# Patient Record
Sex: FEMALE | Race: WHITE | Employment: UNEMPLOYED | URBAN - METROPOLITAN AREA
[De-identification: names, ages, dates, MRNs, and addresses within clinical notes are randomized per-mention and may not be internally consistent; named-entity substitution may affect disease eponyms.]

---

## 2019-12-03 LAB
ABO, EXTERNAL RESULT: NORMAL
HEP B, EXTERNAL RESULT: NEGATIVE
RH FACTOR, EXTERNAL RESULT: POSITIVE
RPR, EXTERNAL RESULT: NON REACTIVE
RUBELLA TITER, EXTERNAL RESULT: NORMAL

## 2020-04-20 LAB
GBS, EXTERNAL RESULT: NEGATIVE
HEPATITIS C ANTIBODY, EXTERNAL RESULT: NEGATIVE

## 2020-05-12 LAB
C. TRACHOMATIS, EXTERNAL RESULT: NEGATIVE
N. GONORRHOEAE, EXTERNAL RESULT: NEGATIVE

## 2020-06-26 ENCOUNTER — OFFICE VISIT (OUTPATIENT)
Dept: PRIMARY CARE CLINIC | Age: 30
End: 2020-06-26

## 2020-06-26 PROCEDURE — 99211 OFF/OP EST MAY X REQ PHY/QHP: CPT | Performed by: FAMILY MEDICINE

## 2020-06-26 NOTE — PROGRESS NOTES
Patient is having a delivery with Alessandra Elena on 7/10/20 and was seen at clinic for prenatal covid test. . Patient instructed to self quarantine until she goes into labor or is instructed to go to hospital.    Napoleon Mason MD, personally performed the services described in the documentation as scribed by the CMA/LPN/RN listed in the chart, in my presence and it is both accurate and complete.   Electronically signed by Latasha Cleaning MD on 6/29/2020 at 8:26 AM.

## 2020-06-29 LAB
SARS-COV-2: NOT DETECTED
SOURCE: NORMAL

## 2020-07-07 ENCOUNTER — OFFICE VISIT (OUTPATIENT)
Dept: PRIMARY CARE CLINIC | Age: 30
End: 2020-07-07

## 2020-07-07 LAB — SARS-COV-2, PCR: NOT DETECTED

## 2020-07-07 PROCEDURE — 99211 OFF/OP EST MAY X REQ PHY/QHP: CPT | Performed by: NURSE PRACTITIONER

## 2020-07-10 ENCOUNTER — HOSPITAL ENCOUNTER (INPATIENT)
Age: 30
LOS: 1 days | Discharge: HOME | End: 2020-07-11
Attending: OBSTETRICS & GYNECOLOGY | Admitting: OBSTETRICS & GYNECOLOGY
Payer: COMMERCIAL

## 2020-07-10 ENCOUNTER — ANESTHESIA (OUTPATIENT)
Dept: LABOR AND DELIVERY | Age: 30
End: 2020-07-10
Payer: COMMERCIAL

## 2020-07-10 ENCOUNTER — ANESTHESIA EVENT (OUTPATIENT)
Dept: LABOR AND DELIVERY | Age: 30
End: 2020-07-10
Payer: COMMERCIAL

## 2020-07-10 ENCOUNTER — APPOINTMENT (OUTPATIENT)
Dept: LABOR AND DELIVERY | Age: 30
End: 2020-07-10
Payer: COMMERCIAL

## 2020-07-10 PROBLEM — Z34.90 TERM PREGNANCY: Status: ACTIVE | Noted: 2020-07-10

## 2020-07-10 LAB
AMPHETAMINE SCREEN, URINE: NORMAL
BARBITURATE SCREEN URINE: NORMAL
BASOPHILS ABSOLUTE: 0 K/UL (ref 0–0.2)
BASOPHILS RELATIVE PERCENT: 0.6 %
BENZODIAZEPINE SCREEN, URINE: NORMAL
BUPRENORPHINE URINE: NORMAL
CANNABINOID SCREEN URINE: NORMAL
COCAINE METABOLITE SCREEN URINE: NORMAL
EOSINOPHILS ABSOLUTE: 0 K/UL (ref 0–0.6)
EOSINOPHILS RELATIVE PERCENT: 0.7 %
HCT VFR BLD CALC: 34.7 % (ref 36–48)
HEMOGLOBIN: 11.9 G/DL (ref 12–16)
LYMPHOCYTES ABSOLUTE: 1.5 K/UL (ref 1–5.1)
LYMPHOCYTES RELATIVE PERCENT: 21.8 %
Lab: NORMAL
MCH RBC QN AUTO: 30 PG (ref 26–34)
MCHC RBC AUTO-ENTMCNC: 34.2 G/DL (ref 31–36)
MCV RBC AUTO: 87.8 FL (ref 80–100)
METHADONE SCREEN, URINE: NORMAL
MONOCYTES ABSOLUTE: 0.5 K/UL (ref 0–1.3)
MONOCYTES RELATIVE PERCENT: 8.2 %
NEUTROPHILS ABSOLUTE: 4.6 K/UL (ref 1.7–7.7)
NEUTROPHILS RELATIVE PERCENT: 68.7 %
OPIATE SCREEN URINE: NORMAL
OXYCODONE URINE: NORMAL
PDW BLD-RTO: 12.7 % (ref 12.4–15.4)
PH UA: 7
PHENCYCLIDINE SCREEN URINE: NORMAL
PLATELET # BLD: 186 K/UL (ref 135–450)
PMV BLD AUTO: 7.9 FL (ref 5–10.5)
PROPOXYPHENE SCREEN: NORMAL
RBC # BLD: 3.95 M/UL (ref 4–5.2)
SPECIMEN STATUS: NORMAL
TOTAL SYPHILLIS IGG/IGM: NORMAL
WBC # BLD: 6.7 K/UL (ref 4–11)

## 2020-07-10 PROCEDURE — 2580000003 HC RX 258

## 2020-07-10 PROCEDURE — 6370000000 HC RX 637 (ALT 250 FOR IP): Performed by: OBSTETRICS & GYNECOLOGY

## 2020-07-10 PROCEDURE — 10907ZC DRAINAGE OF AMNIOTIC FLUID, THERAPEUTIC FROM PRODUCTS OF CONCEPTION, VIA NATURAL OR ARTIFICIAL OPENING: ICD-10-PCS | Performed by: OBSTETRICS & GYNECOLOGY

## 2020-07-10 PROCEDURE — 51701 INSERT BLADDER CATHETER: CPT

## 2020-07-10 PROCEDURE — 80307 DRUG TEST PRSMV CHEM ANLYZR: CPT

## 2020-07-10 PROCEDURE — 0KQM0ZZ REPAIR PERINEUM MUSCLE, OPEN APPROACH: ICD-10-PCS | Performed by: OBSTETRICS & GYNECOLOGY

## 2020-07-10 PROCEDURE — 1220000000 HC SEMI PRIVATE OB R&B

## 2020-07-10 PROCEDURE — 3700000025 EPIDURAL BLOCK: Performed by: ANESTHESIOLOGY

## 2020-07-10 PROCEDURE — 85025 COMPLETE CBC W/AUTO DIFF WBC: CPT

## 2020-07-10 PROCEDURE — 2580000003 HC RX 258: Performed by: OBSTETRICS & GYNECOLOGY

## 2020-07-10 PROCEDURE — 7200000001 HC VAGINAL DELIVERY

## 2020-07-10 PROCEDURE — 2500000003 HC RX 250 WO HCPCS: Performed by: NURSE ANESTHETIST, CERTIFIED REGISTERED

## 2020-07-10 PROCEDURE — 86780 TREPONEMA PALLIDUM: CPT

## 2020-07-10 RX ORDER — FERROUS SULFATE 325(65) MG
325 TABLET ORAL 2 TIMES DAILY WITH MEALS
Status: DISCONTINUED | OUTPATIENT
Start: 2020-07-10 | End: 2020-07-11 | Stop reason: HOSPADM

## 2020-07-10 RX ORDER — ONDANSETRON 2 MG/ML
4 INJECTION INTRAMUSCULAR; INTRAVENOUS EVERY 6 HOURS PRN
Status: DISCONTINUED | OUTPATIENT
Start: 2020-07-10 | End: 2020-07-10

## 2020-07-10 RX ORDER — ACETAMINOPHEN 325 MG/1
650 TABLET ORAL EVERY 4 HOURS PRN
Status: DISCONTINUED | OUTPATIENT
Start: 2020-07-10 | End: 2020-07-11 | Stop reason: HOSPADM

## 2020-07-10 RX ORDER — DOCUSATE SODIUM 100 MG/1
100 CAPSULE, LIQUID FILLED ORAL 2 TIMES DAILY PRN
Status: DISCONTINUED | OUTPATIENT
Start: 2020-07-10 | End: 2020-07-11 | Stop reason: HOSPADM

## 2020-07-10 RX ORDER — ACETAMINOPHEN 325 MG/1
650 TABLET ORAL EVERY 4 HOURS PRN
Status: DISCONTINUED | OUTPATIENT
Start: 2020-07-10 | End: 2020-07-10

## 2020-07-10 RX ORDER — SODIUM CHLORIDE 0.9 % (FLUSH) 0.9 %
10 SYRINGE (ML) INJECTION EVERY 12 HOURS SCHEDULED
Status: DISCONTINUED | OUTPATIENT
Start: 2020-07-10 | End: 2020-07-10

## 2020-07-10 RX ORDER — SODIUM CHLORIDE, SODIUM LACTATE, POTASSIUM CHLORIDE, CALCIUM CHLORIDE 600; 310; 30; 20 MG/100ML; MG/100ML; MG/100ML; MG/100ML
INJECTION, SOLUTION INTRAVENOUS CONTINUOUS
Status: DISCONTINUED | OUTPATIENT
Start: 2020-07-10 | End: 2020-07-10

## 2020-07-10 RX ORDER — DIPHENHYDRAMINE HCL 25 MG
25 TABLET ORAL EVERY 4 HOURS PRN
Status: DISCONTINUED | OUTPATIENT
Start: 2020-07-10 | End: 2020-07-10

## 2020-07-10 RX ORDER — LANOLIN 100 %
OINTMENT (GRAM) TOPICAL PRN
Status: DISCONTINUED | OUTPATIENT
Start: 2020-07-10 | End: 2020-07-11 | Stop reason: HOSPADM

## 2020-07-10 RX ORDER — BUPIVACAINE HYDROCHLORIDE 2.5 MG/ML
INJECTION, SOLUTION EPIDURAL; INFILTRATION; INTRACAUDAL PRN
Status: DISCONTINUED | OUTPATIENT
Start: 2020-07-10 | End: 2020-07-10 | Stop reason: SDUPTHER

## 2020-07-10 RX ORDER — SIMETHICONE 80 MG
80 TABLET,CHEWABLE ORAL EVERY 6 HOURS PRN
Status: DISCONTINUED | OUTPATIENT
Start: 2020-07-10 | End: 2020-07-11 | Stop reason: HOSPADM

## 2020-07-10 RX ORDER — BUPIVACAINE HYDROCHLORIDE 5 MG/ML
INJECTION, SOLUTION EPIDURAL; INTRACAUDAL PRN
Status: DISCONTINUED | OUTPATIENT
Start: 2020-07-10 | End: 2020-07-10 | Stop reason: SDUPTHER

## 2020-07-10 RX ORDER — SODIUM CHLORIDE 0.9 % (FLUSH) 0.9 %
10 SYRINGE (ML) INJECTION EVERY 12 HOURS SCHEDULED
Status: DISCONTINUED | OUTPATIENT
Start: 2020-07-10 | End: 2020-07-11 | Stop reason: HOSPADM

## 2020-07-10 RX ORDER — SODIUM CHLORIDE, SODIUM LACTATE, POTASSIUM CHLORIDE, CALCIUM CHLORIDE 600; 310; 30; 20 MG/100ML; MG/100ML; MG/100ML; MG/100ML
INJECTION, SOLUTION INTRAVENOUS CONTINUOUS
Status: DISCONTINUED | OUTPATIENT
Start: 2020-07-10 | End: 2020-07-11 | Stop reason: HOSPADM

## 2020-07-10 RX ORDER — SODIUM CHLORIDE 0.9 % (FLUSH) 0.9 %
10 SYRINGE (ML) INJECTION PRN
Status: DISCONTINUED | OUTPATIENT
Start: 2020-07-10 | End: 2020-07-10

## 2020-07-10 RX ORDER — SODIUM CHLORIDE, SODIUM LACTATE, POTASSIUM CHLORIDE, CALCIUM CHLORIDE 600; 310; 30; 20 MG/100ML; MG/100ML; MG/100ML; MG/100ML
INJECTION, SOLUTION INTRAVENOUS
Status: COMPLETED
Start: 2020-07-10 | End: 2020-07-10

## 2020-07-10 RX ORDER — SODIUM CHLORIDE 0.9 % (FLUSH) 0.9 %
10 SYRINGE (ML) INJECTION PRN
Status: DISCONTINUED | OUTPATIENT
Start: 2020-07-10 | End: 2020-07-11 | Stop reason: HOSPADM

## 2020-07-10 RX ORDER — IBUPROFEN 800 MG/1
800 TABLET ORAL EVERY 6 HOURS PRN
Status: DISCONTINUED | OUTPATIENT
Start: 2020-07-10 | End: 2020-07-11 | Stop reason: HOSPADM

## 2020-07-10 RX ORDER — ONDANSETRON 2 MG/ML
4 INJECTION INTRAMUSCULAR; INTRAVENOUS EVERY 6 HOURS PRN
Status: DISCONTINUED | OUTPATIENT
Start: 2020-07-10 | End: 2020-07-11 | Stop reason: HOSPADM

## 2020-07-10 RX ADMIN — SODIUM CHLORIDE, POTASSIUM CHLORIDE, SODIUM LACTATE AND CALCIUM CHLORIDE 1000 ML: 600; 310; 30; 20 INJECTION, SOLUTION INTRAVENOUS at 02:30

## 2020-07-10 RX ADMIN — DOCUSATE SODIUM 100 MG: 100 CAPSULE, LIQUID FILLED ORAL at 18:55

## 2020-07-10 RX ADMIN — Medication 10 ML: at 22:26

## 2020-07-10 RX ADMIN — WITCH HAZEL 40 EACH: 500 SOLUTION RECTAL; TOPICAL at 10:33

## 2020-07-10 RX ADMIN — SODIUM CHLORIDE, POTASSIUM CHLORIDE, SODIUM LACTATE AND CALCIUM CHLORIDE: 600; 310; 30; 20 INJECTION, SOLUTION INTRAVENOUS at 04:28

## 2020-07-10 RX ADMIN — ACETAMINOPHEN 650 MG: 325 TABLET ORAL at 22:26

## 2020-07-10 RX ADMIN — BUPIVACAINE HYDROCHLORIDE 8 ML: 2.5 INJECTION, SOLUTION EPIDURAL; INFILTRATION; INTRACAUDAL; PERINEURAL at 03:11

## 2020-07-10 RX ADMIN — BUPIVACAINE HYDROCHLORIDE 4 ML: 5 INJECTION, SOLUTION EPIDURAL; INTRACAUDAL; PERINEURAL at 04:01

## 2020-07-10 RX ADMIN — SODIUM CHLORIDE, POTASSIUM CHLORIDE, SODIUM LACTATE AND CALCIUM CHLORIDE: 600; 310; 30; 20 INJECTION, SOLUTION INTRAVENOUS at 02:44

## 2020-07-10 RX ADMIN — BENZOCAINE AND LEVOMENTHOL: 200; 5 SPRAY TOPICAL at 10:33

## 2020-07-10 RX ADMIN — IBUPROFEN 800 MG: 800 TABLET, FILM COATED ORAL at 18:55

## 2020-07-10 RX ADMIN — Medication 15 ML/HR: at 03:18

## 2020-07-10 SDOH — HEALTH STABILITY: MENTAL HEALTH: HOW OFTEN DO YOU HAVE A DRINK CONTAINING ALCOHOL?: NEVER

## 2020-07-10 ASSESSMENT — PAIN SCALES - GENERAL
PAINLEVEL_OUTOF10: 3
PAINLEVEL_OUTOF10: 3

## 2020-07-10 NOTE — PROGRESS NOTES
First time get up to BR w/ assist x 2 RN's. Pt voided 500 cc urine without difficulty. Pt performed kelli care per self after instruction. New ice pack, peripad, and underwear provided. Pt back to bed w/ out incident, gait steady. Pt tolerated well.

## 2020-07-10 NOTE — PLAN OF CARE
Problem: Anxiety:  Goal: Level of anxiety will decrease  Description: Level of anxiety will decrease  Outcome: Ongoing     Problem: Breathing Pattern - Ineffective:  Goal: Able to breathe comfortably  Description: Able to breathe comfortably  Outcome: Ongoing     Problem: Fluid Volume - Imbalance:  Goal: Absence of imbalanced fluid volume signs and symptoms  Description: Absence of imbalanced fluid volume signs and symptoms  Outcome: Ongoing  Goal: Absence of intrapartum hemorrhage signs and symptoms  Description: Absence of intrapartum hemorrhage signs and symptoms  Outcome: Ongoing     Problem: Infection - Intrapartum Infection:  Goal: Will show no infection signs and symptoms  Description: Will show no infection signs and symptoms  Outcome: Ongoing     Problem: Labor Process - Prolonged:  Goal: Labor progression, first stage, within specified pattern  Description: Labor progression, first stage, within specified pattern  Outcome: Ongoing  Goal: Labor progession, second stage, within specified pattern  Description: Labor progession, second stage, within specified pattern  Outcome: Ongoing  Goal: Uterine contractions within specified parameters  Description: Uterine contractions within specified parameters  Outcome: Ongoing     Problem:  Screening:  Goal: Ability to make informed decisions regarding treatment has improved  Description: Ability to make informed decisions regarding treatment has improved  Outcome: Ongoing     Problem: Pain - Acute:  Goal: Pain level will decrease  Description: Pain level will decrease  Outcome: Ongoing  Goal: Able to cope with pain  Description: Able to cope with pain  Outcome: Ongoing     Problem: Tissue Perfusion - Uteroplacental, Altered:  Goal: Absence of abnormal fetal heart rate pattern  Description: Absence of abnormal fetal heart rate pattern  Outcome: Ongoing     Problem: Urinary Retention:  Goal: Experiences of bladder distention will decrease  Description: Experiences of bladder distention will decrease  Outcome: Ongoing  Goal: Urinary elimination within specified parameters  Description: Urinary elimination within specified parameters  Outcome: Ongoing     Problem: Pain:  Goal: Pain level will decrease  Description: Pain level will decrease  Outcome: Ongoing  Goal: Control of acute pain  Description: Control of acute pain  Outcome: Ongoing  Goal: Control of chronic pain  Description: Control of chronic pain  Outcome: Ongoing

## 2020-07-10 NOTE — ANESTHESIA PRE PROCEDURE
Department of Anesthesiology  Preprocedure Note       Name:  Joel Batista   Age:  26 y.o.  :  1990                                          MRN:  [id number]         Date:  7/10/2020      Surgeon: * No surgeons listed *    Procedure: * No procedures listed *    Medications prior to admission:   Prior to Admission medications    Medication Sig Start Date End Date Taking? Authorizing Provider   Prenatal Multivit-Min-Fe-FA (PRE- FORMULA) TABS Take by mouth   Yes Historical Provider, MD       Current medications:    Current Facility-Administered Medications   Medication Dose Route Frequency Provider Last Rate Last Dose    lactated ringers infusion   Intravenous Continuous Yordy Solis  mL/hr at 07/10/20 0244      sodium chloride flush 0.9 % injection 10 mL  10 mL Intravenous 2 times per day Yordy Solis MD        sodium chloride flush 0.9 % injection 10 mL  10 mL Intravenous PRN Yordy Solis MD        acetaminophen (TYLENOL) tablet 650 mg  650 mg Oral Q4H PRN Yordy Solis MD        diphenhydrAMINE (BENADRYL) tablet 25 mg  25 mg Oral Q4H PRN Yordy Solis MD        ondansetron [facility]) injection 4 mg  4 mg Intravenous Q6H PRN Yordy Solis MD        oxytocin (PITOCIN) 30 units in 500 mL infusion  1 leonard-units/min Intravenous Continuous PRN Yordy Solis MD           Allergies:  No Known Allergies    Problem List:  There is no problem list on file for this patient. Past Medical History:        Diagnosis Date    Anemia     with previous pregnancy       Past Surgical History:  History reviewed. No pertinent surgical history.     Social History:    Social History     Tobacco Use    Smoking status: Never Smoker    Smokeless tobacco: Never Used   Substance Use Topics    Alcohol use: Never     Frequency: Never                                Counseling given: Not Answered      Vital Signs (Current):   Vitals: 07/10/20 0227 07/10/20 0236   BP:  131/78   Pulse:  76   Resp:  20   Temp:  36.5 °C (97.7 °F)   TempSrc:  Oral   Weight: 210 lb (95.3 kg)    Height: 5' 5\" (1.651 m)                                               BP Readings from Last 3 Encounters:   07/10/20 131/78       NPO Status: Time of last liquid consumption: 2300                        Time of last solid consumption: 2000                        Date of last liquid consumption: 07/09/20                        Date of last solid food consumption: 07/09/20    BMI:   Wt Readings from Last 3 Encounters:   07/10/20 210 lb (95.3 kg)     Body mass index is 34.95 kg/m². CBC:   Lab Results   Component Value Date    WBC 6.7 07/10/2020    RBC 3.95 07/10/2020    HGB 11.9 07/10/2020    HCT 34.7 07/10/2020    MCV 87.8 07/10/2020    RDW 12.7 07/10/2020     07/10/2020       CMP: No results found for: NA, K, CL, CO2, BUN, CREATININE, GFRAA, AGRATIO, LABGLOM, GLUCOSE, PROT, CALCIUM, BILITOT, ALKPHOS, AST, ALT    POC Tests: No results for input(s): POCGLU, POCNA, POCK, POCCL, POCBUN, POCHEMO, POCHCT in the last 72 hours.     Coags: No results found for: PROTIME, INR, APTT    HCG (If Applicable): No results found for: PREGTESTUR, PREGSERUM, HCG, HCGQUANT     ABGs: No results found for: PHART, PO2ART, AQL3WVY, EAL8IOT, BEART, W1SEVQPO     Type & Screen (If Applicable):  No results found for: LABABO, LABRH    Drug/Infectious Status (If Applicable):  No results found for: HIV, HEPCAB    COVID-19 Screening (If Applicable):   Lab Results   Component Value Date    COVID19 Not Detected 07/07/2020         Anesthesia Evaluation  Patient summary reviewed and Nursing notes reviewed no history of anesthetic complications:   Airway: Mallampati: II     Neck ROM: full   Dental: normal exam         Pulmonary:Negative Pulmonary ROS and normal exam                               Cardiovascular:Negative CV ROS                      Neuro/Psych:   Negative Neuro/Psych ROS GI/Hepatic/Renal: Neg GI/Hepatic/Renal ROS            Endo/Other: Negative Endo/Other ROS                    Abdominal:           Vascular:                                        Anesthesia Plan      epidural     ASA 2 - emergent     (I discussed with the patient the risks and benefits of labor epidural placement. All questions were answered the patient agrees with the plan. Recent Vitals:  ---------------------------               07/10/20                      0236         ---------------------------   BP:          131/78         Pulse:         76           Resp:          20           Temp:   36.5 °C (97.7 °F)  ---------------------------  Body mass index is 34.95 kg/m². Social History    Tobacco Use      Smoking status: Never Smoker      Smokeless tobacco: Never Used      LABS:    CBC  Lab Results       Component                Value               Date/Time                  WBC                      6.7                 07/10/2020 02:22 AM        HGB                      11.9 (L)            07/10/2020 02:22 AM        HCT                      34.7 (L)            07/10/2020 02:22 AM        PLT                      186                 07/10/2020 02:22 AM   RENAL  No results found for: NA, K, CL, CO2, BUN, CREATININE, GLUCOSE  COAGS  No results found for: PROTIME, INR, APTT)        Anesthetic plan and risks discussed with patient.                       Grabiel Cheek, APRN - CRNA   7/10/2020

## 2020-07-10 NOTE — FLOWSHEET NOTE
Pt presents to triage with complaints of contractions since 2330. Pt denies any leaking of fluid or vaginal bleeding. Pt to triage room #2.

## 2020-07-10 NOTE — H&P
Department of Obstetrics and Gynecology  Labor and Delivery   Attending Progress Note      SUBJECTIVE:  Patient presented in active labor    OBJECTIVE:      Fetal heart rate:       Baseline Heart Rate:  145        Accelerations:  present       Long Term Variability:  moderate       Decelerations:  absent         Contraction frequency: 3 minutes    Membranes:  Intact    Cervix:  6 cm at presentation                  ASSESSMENT & PLAN:    26 y.o.    OB History        2    Para   1    Term   1            AB        Living   1       SAB        TAB        Ectopic        Molar        Multiple        Live Births   1             40w0d  1. FWB: reassuring  2. ACOG reviewed. A+, GBS neg. ES wnl.   Need stick injury in April (Hiv,HepC: neg)

## 2020-07-10 NOTE — ANESTHESIA PROCEDURE NOTES
Epidural Block    Patient location during procedure: OB  Reason for block: labor epidural  Staffing  Resident/CRNA: MARILEE Cisse - CRNA  Preanesthetic Checklist  Completed: patient identified, pre-op evaluation, timeout performed, IV checked, risks and benefits discussed, monitors and equipment checked, anesthesia consent given, oxygen available and patient being monitored  Epidural  Patient position: sitting  Prep: ChloraPrep and site prepped and draped  Patient monitoring: continuous pulse ox and frequent blood pressure checks  Approach: midline  Location: lumbar (1-5)  Injection technique: HANNA saline  Provider prep: mask and sterile gloves  Needle  Needle type: Tuohy   Needle gauge: 17 G  Needle length: 3.5 in  Needle insertion depth: 7 cm  Catheter type: side hole  Catheter size: 19 G  Catheter at skin depth: 12 cm  Test dose: negative  Assessment  Hemodynamics: stable  Attempts: 1  Additional Notes  Pt prepped and draped in sterile fashion. Skin wheal with 1% lidocaine. HANNA with 3 cc NS, 25g spinal needle inserted per adán. Clear CSF visualized in hub. Needle withdrawn and catheter threaded. Negative test dose 3cc 1.5% Lidocaine with Epinephrine. Sterile dressing applied.

## 2020-07-10 NOTE — L&D DELIVERY SUMMARY NOTE
[address] [address]                            LABOR AND DELIVERY NOTE    PATIENT NAME: Kym Martinez                 :        1990  MED REC NO:   [id number]                          ROOM:       0387  ACCOUNT NO:   [de-identified]                           ADMIT DATE: 07/10/2020  PROVIDER:     Merly Dean MD    DATE OF PROCEDURE:  07/10/2020    DELIVERY SUMMARY    The patient is a 25-year-old G2, P1-0-0-1 at 40 weeks gestation who came  in active phase labor. Her pregnancy was uncomplicated. She requested  and received an epidural.  She made change to complete and +1 station. She is artificially ruptured for clear fluid. She is GBS negative. Did  not receive antibiotics. She began pushing. She pushed for  approximately 5 minutes. The head was at the perineum. She had a  spontaneous vaginal delivery of a baby boy in OA position and rest of  the infant delivered atraumatically. Placed on the mother's abdomen. Cord was cut and clamped after one minute of delayed cord clamping. Delivery of placenta was spontaneous. Perineum and vagina were  explored. A second-degree laceration was repaired in a standard fashion  using 3-0 Vicryl. . Mom and baby doing well at the end of this  dictation.         Robert Groves MD    D: 07/10/2020 9:28:47       T: 07/10/2020 13:21:56     [email or web address]  Job#: [id number]     Doc#: [id number]    CC:

## 2020-07-10 NOTE — L&D DELIVERY NOTE
Department of Obstetrics and Gynecology  Spontaneous Vaginal Delivery Note    Labor & Delivery Summary  Labor Onset Date: 07/10/20  Labor Onset Time: 0210  Dilation Complete Date: 07/10/20  Dilation Complete Time: 55    Pre-operative Diagnosis: 25 yo  at 40wk SOL   Post-operative Diagnosis:  Living  infant(s) and Male    Procedure:  Spontaneous vaginal delivery    Surgeon:   Dr. Rivera Grams for the patient's :  Izzy Boone [[id number]]   APGAR One: 8     Information for the patient's :  Luis Miguel Abrahama [[id number]]   APGAR Five: 9      Information for the patient's :  Jill Burton, Baby Boy Slivia Abrahama [[id number]]   Birth Weight: N/A      Anesthesia:  epidural anesthesia    Estimated blood loss:  200ml    Specimen:  Placenta not sent to pathology     Cord blood sent No    Complications:  none    Condition:  infant stable to general nursery and mother stable    Details of Procedure: The patient is a 26 y.o. female at 37w0d   OB History        2    Para   1    Term   1            AB        Living   1       SAB        TAB        Ectopic        Molar        Multiple        Live Births   1             who was admitted for active phase labor. She received the following interventions: Tila Alessandro She was known to be GBS negative and did not receive antibiotic prophylaxis. The patient progressed well,did  receive an epidural, became complete and started to push. After pushing for 5 min the fetal head was at the perineum and the rest of the infant delivered atraumatically, placed on mother abdomen. Cord was clamped and cut after 1 minute delayed cord clamping. The delivery of the placenta was spontaneous. The perineum and vagina were explored and a second degree laceration was repaired in standard fashion.     Dictation #: [id number]    Curtis Byers MD

## 2020-07-11 VITALS
TEMPERATURE: 98.1 F | RESPIRATION RATE: 18 BRPM | HEIGHT: 65 IN | HEART RATE: 83 BPM | WEIGHT: 210 LBS | DIASTOLIC BLOOD PRESSURE: 68 MMHG | SYSTOLIC BLOOD PRESSURE: 109 MMHG | BODY MASS INDEX: 34.99 KG/M2

## 2020-07-11 LAB
HCT VFR BLD CALC: 31.5 % (ref 36–48)
HEMOGLOBIN: 10.6 G/DL (ref 12–16)
MCH RBC QN AUTO: 30.5 PG (ref 26–34)
MCHC RBC AUTO-ENTMCNC: 33.6 G/DL (ref 31–36)
MCV RBC AUTO: 90.8 FL (ref 80–100)
PDW BLD-RTO: 12.5 % (ref 12.4–15.4)
PLATELET # BLD: 166 K/UL (ref 135–450)
PMV BLD AUTO: 8.1 FL (ref 5–10.5)
RBC # BLD: 3.47 M/UL (ref 4–5.2)
WBC # BLD: 7.7 K/UL (ref 4–11)

## 2020-07-11 PROCEDURE — 6370000000 HC RX 637 (ALT 250 FOR IP): Performed by: OBSTETRICS & GYNECOLOGY

## 2020-07-11 PROCEDURE — 36415 COLL VENOUS BLD VENIPUNCTURE: CPT

## 2020-07-11 PROCEDURE — 85027 COMPLETE CBC AUTOMATED: CPT

## 2020-07-11 RX ADMIN — DOCUSATE SODIUM 100 MG: 100 CAPSULE, LIQUID FILLED ORAL at 09:45

## 2020-07-11 RX ADMIN — WITCH HAZEL 40 EACH: 500 SOLUTION RECTAL; TOPICAL at 09:45

## 2020-07-11 RX ADMIN — IBUPROFEN 800 MG: 800 TABLET, FILM COATED ORAL at 09:45

## 2020-07-11 RX ADMIN — BENZOCAINE AND LEVOMENTHOL: 200; 5 SPRAY TOPICAL at 09:45

## 2020-07-11 ASSESSMENT — PAIN SCALES - GENERAL: PAINLEVEL_OUTOF10: 0

## 2020-07-11 NOTE — DISCHARGE SUMMARY
Department of Obstetrics and Gynecology  Postpartum Discharge Summary      Admit Date: 7/10/2020    Admit Diagnosis: Term pregnancy [Z34.90]    Discharge Date: 20    Condition at Discharge: good    Discharge Diagnoses: spontaneous vaginal delivery    Discharge Disposition:  Home    Service: Obstetrics    Postpartum complications: none     Hospital Course: uncomplicated    [place] Data:  Information for the patient's :  Ginna Urbina [[id number]]        Weight   Information for the patient's :  Ginna Urbina [[id number]]        Apgars   Information for the patient's :  Ginna Urbina [[id number]]         Disposition of Baby:  Home with mother      Current Discharge Medication List      CONTINUE these medications which have NOT CHANGED    Details   Prenatal Multivit-Min-Fe-FA (PRE-ANABELA FORMULA) TABS Take by mouth               Follow-up 6 weeks in office. Call for appointment.         Electronically signed by Flavio Lockett MD on 2020 at 9:46 AM

## 2020-07-11 NOTE — PROGRESS NOTES
Department of Obstetrics and Gynecology  Labor and Delivery  Attending Post Partum Progress Note      SUBJECTIVE:  No probs. Lochia normal. Infant is well. Desire circ for infant. Pt wants to go home. OBJECTIVE:      Vitals:  /61   Pulse 76   Temp 98 °F (36.7 °C) (Axillary)   Resp 16   Ht 5' 5\" (1.651 m)   Wt 210 lb (95.3 kg)   LMP 10/04/2019   Breastfeeding Unknown   BMI 34.95 kg/m²     ABDOMEN:  FFNT  GENITAL/URINARY:  Deferred  EXT:  NT    DATA:    CBC:    Lab Results   Component Value Date    WBC 7.7 2020    RBC 3.47 2020    HGB 10.6 2020    HCT 31.5 2020    MCV 90.8 2020    RDW 12.5 2020     2020       ASSESSMENT & PLAN:      IMP:  PPD#1 S/P , doing well. PLAN:  Home. Instructed. Declines Rx/ F/U 6 weeks office.     Matthew Treviño MD

## 2020-07-11 NOTE — ANESTHESIA POSTPROCEDURE EVALUATION
Department of Anesthesiology  Postprocedure Note    Patient: Jesús Garcia  MRN: [id number]  YOB: 1990  Date of evaluation: 7/11/2020  Time:  8:12 AM     Procedure Summary     Date:  07/10/20 Room / Location:      Anesthesia Start:  0300 Anesthesia Stop:  Hollis Mackey    Procedure:  Labor Analgesia Diagnosis:      Scheduled Providers:   Responsible Provider:  Burgess Lolita MD    Anesthesia Type:  epidural ASA Status:  2 - Emergent          Anesthesia Type: epidural    Mali Phase I: [place] Score: 10    Mali Phase II: [place] Score: 10    Last vitals: Reviewed and per EMR flowsheets. Anesthesia Post Evaluation    Level of consciousness: awake and alert  Nausea & Vomiting: no vomiting and no nausea  Complications: no  Cardiovascular status: hemodynamically stable  Respiratory status: acceptable and room air  Hydration status: stable  Comments: Patient is s/p vaginal delivery with epidural analgesia. Progress notes, flow sheets, labs, and MARs reviewed. No apparent or reported anesthesia complications thus far.

## 2020-07-11 NOTE — PROGRESS NOTES
Discharge Phone Call Log  Patient Name: Nandini Durbin     [facility] Care Provider: Jason Torres MD Discharge Date: 2020    Disposition of baby:    Phone Number: [phone] (home)     Attempts to Contact:  Date:    Nurse  Date:    Nurse  Date:    Nurse    1. Now that you are at home is your pain being well controlled? Y/N   What pain reducing measures are you using? ____________________________________        Information for the patient's :  Izzy Boone [[id number]]   Delivery Method: Vaginal, Spontaneous     2. Are you currently  having any infant feeding issues? Y/N _____________________________ If yes, please explain: __________________________________________________________________  3. If breastfeeding, were you satisfied with the breastfeeding support services offered? Y/N  4.  Have you had to supplement? Y/N If yes, please explain: _____________________________________________________  5. Did your OB provider offer you information about the benefits of breastfeeding during your prenatal visits? Y/N  6.  Have you made or have you already had your first appointment with the baby's doctor? Y/N If no, do you know when to schedule it? Y/N   7.  Have you scheduled your follow-up appointment? Y/N  If no, do you know when to schedule it? Y/N  8. Did staff discuss safe sleep during your stay? Y/N  Did you see the wall cling posted in your room explaining how to keep you and your baby safe? Y/N  9. Can you tell me at least 1 point you learned from reading or hearing about infant safety and safe sleep practices? 10. Did your nurses and physicians include you in the plan of care, communicating with you respectfully? Y/N If no, please explain __________________________  11. Is there anyone in particular you would like to mention who provided care for you? ________________________________  12. Did your discharge occur in a timely manner?   Y/N If no, please explain __________________________  13. Do you have any other questions or concerns I can address today?  Y/N  __________________________________________________      Teaching During interview :_____________________________________________  ___________________________RN       Date:______________Time:________________

## 2020-07-11 NOTE — PROGRESS NOTES

## 2020-07-11 NOTE — PLAN OF CARE
Problem: VAGINAL DELIVERY - RECOVERY AND POST PARTUM  Goal: Vital signs are medically acceptable  7/11/2020 1119 by Graydon Bernheim, RN  Outcome: Met This Shift  7/11/2020 0732 by Marcial Tavarez RN  Outcome: Ongoing  Goal: Patient will remain free of falls  7/11/2020 1119 by Graydon Bernheim, RN  Outcome: Met This Shift  7/11/2020 0732 by Marcial Tavarez RN  Outcome: Ongoing  Goal: Fundus firm at midline  7/11/2020 1119 by Graydon Bernheim, RN  Outcome: Met This Shift  7/11/2020 0732 by Marcial Tavarez RN  Outcome: Ongoing  Goal: Moderate rubra without clots, no purulent discharge, no foul smelling lochia  7/11/2020 1119 by Graydon Bernheim, RN  Outcome: Met This Shift  7/11/2020 0732 by Marcial Tavarez RN  Outcome: Ongoing  Goal: Empties bladder  7/11/2020 1119 by Graydon Bernheim, RN  Outcome: Met This Shift  7/11/2020 0732 by Marcial Tavarez RN  Outcome: Ongoing  Goal: Verbalizes understanding of normal bowel function resumption  7/11/2020 1119 by Graydon Bernheim, RN  Outcome: Met This Shift  7/11/2020 0732 by Marcial Tavarez RN  Outcome: Ongoing  Goal: Edema will be absent or minimal  7/11/2020 1119 by Graydon Bernheim, RN  Outcome: Met This Shift  7/11/2020 0732 by Marcial Tavarez RN  Outcome: Ongoing  Goal: Breasts are soft with nipple integrity intact  7/11/2020 1119 by Graydon Bernheim, RN  Outcome: Met This Shift  7/11/2020 0732 by Marcial Tavarez RN  Outcome: Ongoing  Goal: Demonstrates appropriate breast feeding techniques  7/11/2020 1119 by Graydon Bernheim, RN  Outcome: Met This Shift  7/11/2020 0732 by Marcial Tavarez RN  Outcome: Ongoing  Goal: Appropriate behavior observed  7/11/2020 1119 by Graydon Bernheim, RN  Outcome: Met This Shift  7/11/2020 0732 by Marcial Tavarez RN  Outcome: Ongoing  Goal: Positive Mother-Baby interactions are observed  7/11/2020 1119 by Graydon Bernheim, RN  Outcome: Met This Shift  7/11/2020 0732 by Lanice Formica, RN  Outcome: Ongoing  Goal: Perineum intact without discharge or hematoma  7/11/2020 1119 by Graydon Bernheim, RN  Outcome: Met This Shift  7/11/2020 0732 by Dustin Combs RN  Outcome: Ongoing  Goal: Ambulates independently  7/11/2020 1119 by Ernie Duarte RN  Outcome: Met This Shift  7/11/2020 0732 by Dustin Combs RN  Outcome: Ongoing     Problem: PAIN  Goal: Patient's pain/discomfort is manageable  7/11/2020 1119 by Ernie Duarte RN  Outcome: Met This Shift  7/11/2020 0732 by Dustin Combs RN  Outcome: Ongoing     Problem: KNOWLEDGE DEFICIT  Goal: Patient/S.O. demonstrates understanding of disease process, treatment plan, medications, and discharge instructions.   7/11/2020 1119 by Ernie Duarte RN  Outcome: Met This Shift  7/11/2020 0732 by Dustin Combs RN  Outcome: Ongoing

## 2022-05-02 ENCOUNTER — HOSPITAL ENCOUNTER (EMERGENCY)
Age: 32
Discharge: HOME | End: 2022-05-02
Payer: COMMERCIAL

## 2022-05-02 VITALS
DIASTOLIC BLOOD PRESSURE: 72 MMHG | TEMPERATURE: 99.1 F | BODY MASS INDEX: 28.32 KG/M2 | SYSTOLIC BLOOD PRESSURE: 102 MMHG | HEIGHT: 65 IN | RESPIRATION RATE: 16 BRPM | HEART RATE: 89 BPM | OXYGEN SATURATION: 100 % | WEIGHT: 170 LBS

## 2022-05-02 DIAGNOSIS — R21 RASH AND OTHER NONSPECIFIC SKIN ERUPTION: ICD-10-CM

## 2022-05-02 DIAGNOSIS — R74.01 TRANSAMINITIS: ICD-10-CM

## 2022-05-02 DIAGNOSIS — J06.9 ACUTE UPPER RESPIRATORY INFECTION: ICD-10-CM

## 2022-05-02 DIAGNOSIS — R11.0 NAUSEA: ICD-10-CM

## 2022-05-02 DIAGNOSIS — N30.00 ACUTE CYSTITIS WITHOUT HEMATURIA: ICD-10-CM

## 2022-05-02 DIAGNOSIS — J02.9 ACUTE PHARYNGITIS, UNSPECIFIED ETIOLOGY: Primary | ICD-10-CM

## 2022-05-02 DIAGNOSIS — R51.9 NONINTRACTABLE EPISODIC HEADACHE, UNSPECIFIED HEADACHE TYPE: ICD-10-CM

## 2022-05-02 LAB
A/G RATIO: 1.3 (ref 1.1–2.2)
ALBUMIN SERPL-MCNC: 3.8 G/DL (ref 3.4–5)
ALP BLD-CCNC: 209 U/L (ref 40–129)
ALT SERPL-CCNC: 230 U/L (ref 10–40)
ANION GAP SERPL CALCULATED.3IONS-SCNC: 12 MMOL/L (ref 3–16)
AST SERPL-CCNC: 156 U/L (ref 15–37)
BACTERIA: ABNORMAL /HPF
BASOPHILS ABSOLUTE: 0 K/UL (ref 0–0.2)
BASOPHILS RELATIVE PERCENT: 0.2 %
BILIRUB SERPL-MCNC: 1.3 MG/DL (ref 0–1)
BILIRUBIN URINE: NEGATIVE
BLOOD, URINE: ABNORMAL
BUN BLDV-MCNC: 12 MG/DL (ref 7–20)
CALCIUM SERPL-MCNC: 9.3 MG/DL (ref 8.3–10.6)
CHLORIDE BLD-SCNC: 99 MMOL/L (ref 99–110)
CLARITY: CLEAR
CO2: 24 MMOL/L (ref 21–32)
COLOR: YELLOW
CREAT SERPL-MCNC: 0.6 MG/DL (ref 0.6–1.1)
EOSINOPHILS ABSOLUTE: 0 K/UL (ref 0–0.6)
EOSINOPHILS RELATIVE PERCENT: 0.1 %
EPITHELIAL CELLS, UA: ABNORMAL /HPF (ref 0–5)
GFR AFRICAN AMERICAN: >60
GFR NON-AFRICAN AMERICAN: >60
GLUCOSE BLD-MCNC: 96 MG/DL (ref 70–99)
GLUCOSE URINE: 500 MG/DL
HCG QUALITATIVE: NEGATIVE
HCT VFR BLD CALC: 38.4 % (ref 36–48)
HEMOGLOBIN: 13.4 G/DL (ref 12–16)
KETONES, URINE: 15 MG/DL
LEUKOCYTE ESTERASE, URINE: ABNORMAL
LYMPHOCYTES ABSOLUTE: 0.4 K/UL (ref 1–5.1)
LYMPHOCYTES RELATIVE PERCENT: 12.9 %
MCH RBC QN AUTO: 30.5 PG (ref 26–34)
MCHC RBC AUTO-ENTMCNC: 34.9 G/DL (ref 31–36)
MCV RBC AUTO: 87.4 FL (ref 80–100)
MICROSCOPIC EXAMINATION: YES
MONOCYTES ABSOLUTE: 0.2 K/UL (ref 0–1.3)
MONOCYTES RELATIVE PERCENT: 4.5 %
NEUTROPHILS ABSOLUTE: 2.7 K/UL (ref 1.7–7.7)
NEUTROPHILS RELATIVE PERCENT: 82.3 %
NITRITE, URINE: NEGATIVE
PDW BLD-RTO: 11.7 % (ref 12.4–15.4)
PH UA: 6 (ref 5–8)
PLATELET # BLD: 144 K/UL (ref 135–450)
PMV BLD AUTO: 7.4 FL (ref 5–10.5)
POTASSIUM REFLEX MAGNESIUM: 3.7 MMOL/L (ref 3.5–5.1)
PROTEIN UA: NEGATIVE MG/DL
RBC # BLD: 4.39 M/UL (ref 4–5.2)
RBC UA: ABNORMAL /HPF (ref 0–4)
S PYO AG THROAT QL: NEGATIVE
SARS-COV-2, NAAT: NOT DETECTED
SODIUM BLD-SCNC: 135 MMOL/L (ref 136–145)
SPECIFIC GRAVITY UA: <=1.005 (ref 1–1.03)
TOTAL PROTEIN: 6.8 G/DL (ref 6.4–8.2)
URINE TYPE: ABNORMAL
UROBILINOGEN, URINE: 0.2 E.U./DL
WBC # BLD: 3.3 K/UL (ref 4–11)
WBC UA: ABNORMAL /HPF (ref 0–5)

## 2022-05-02 PROCEDURE — 99284 EMERGENCY DEPT VISIT MOD MDM: CPT

## 2022-05-02 PROCEDURE — 87635 SARS-COV-2 COVID-19 AMP PRB: CPT

## 2022-05-02 PROCEDURE — 87186 SC STD MICRODIL/AGAR DIL: CPT

## 2022-05-02 PROCEDURE — 87880 STREP A ASSAY W/OPTIC: CPT

## 2022-05-02 PROCEDURE — 81001 URINALYSIS AUTO W/SCOPE: CPT

## 2022-05-02 PROCEDURE — 96375 TX/PRO/DX INJ NEW DRUG ADDON: CPT

## 2022-05-02 PROCEDURE — 87086 URINE CULTURE/COLONY COUNT: CPT

## 2022-05-02 PROCEDURE — 6360000002 HC RX W HCPCS: Performed by: NURSE PRACTITIONER

## 2022-05-02 PROCEDURE — 85025 COMPLETE CBC W/AUTO DIFF WBC: CPT

## 2022-05-02 PROCEDURE — 96374 THER/PROPH/DIAG INJ IV PUSH: CPT

## 2022-05-02 PROCEDURE — 6370000000 HC RX 637 (ALT 250 FOR IP): Performed by: NURSE PRACTITIONER

## 2022-05-02 PROCEDURE — 80053 COMPREHEN METABOLIC PANEL: CPT

## 2022-05-02 PROCEDURE — 84703 CHORIONIC GONADOTROPIN ASSAY: CPT

## 2022-05-02 PROCEDURE — 2580000003 HC RX 258: Performed by: NURSE PRACTITIONER

## 2022-05-02 PROCEDURE — 87077 CULTURE AEROBIC IDENTIFY: CPT

## 2022-05-02 PROCEDURE — 87088 URINE BACTERIA CULTURE: CPT

## 2022-05-02 PROCEDURE — 87081 CULTURE SCREEN ONLY: CPT

## 2022-05-02 RX ORDER — CEFDINIR 300 MG/1
300 CAPSULE ORAL 2 TIMES DAILY
Qty: 10 CAPSULE | Refills: 0 | Status: SHIPPED | OUTPATIENT
Start: 2022-05-02 | End: 2022-05-07

## 2022-05-02 RX ORDER — METOCLOPRAMIDE HYDROCHLORIDE 5 MG/ML
10 INJECTION INTRAMUSCULAR; INTRAVENOUS ONCE
Status: COMPLETED | OUTPATIENT
Start: 2022-05-02 | End: 2022-05-02

## 2022-05-02 RX ORDER — DIPHENHYDRAMINE HYDROCHLORIDE 50 MG/ML
25 INJECTION INTRAMUSCULAR; INTRAVENOUS ONCE
Status: COMPLETED | OUTPATIENT
Start: 2022-05-02 | End: 2022-05-02

## 2022-05-02 RX ORDER — KETOROLAC TROMETHAMINE 30 MG/ML
30 INJECTION, SOLUTION INTRAMUSCULAR; INTRAVENOUS ONCE
Status: COMPLETED | OUTPATIENT
Start: 2022-05-02 | End: 2022-05-02

## 2022-05-02 RX ORDER — CEFDINIR 300 MG/1
300 CAPSULE ORAL ONCE
Status: COMPLETED | OUTPATIENT
Start: 2022-05-02 | End: 2022-05-02

## 2022-05-02 RX ORDER — PREDNISONE 10 MG/1
40 TABLET ORAL DAILY
Qty: 16 TABLET | Refills: 0 | Status: SHIPPED | OUTPATIENT
Start: 2022-05-02 | End: 2022-05-06

## 2022-05-02 RX ORDER — SODIUM CHLORIDE, SODIUM LACTATE, POTASSIUM CHLORIDE, CALCIUM CHLORIDE 600; 310; 30; 20 MG/100ML; MG/100ML; MG/100ML; MG/100ML
1000 INJECTION, SOLUTION INTRAVENOUS ONCE
Status: DISCONTINUED | OUTPATIENT
Start: 2022-05-02 | End: 2022-05-02

## 2022-05-02 RX ORDER — ONDANSETRON 4 MG/1
4 TABLET, ORALLY DISINTEGRATING ORAL EVERY 8 HOURS PRN
Qty: 20 TABLET | Refills: 0 | Status: SHIPPED | OUTPATIENT
Start: 2022-05-02

## 2022-05-02 RX ORDER — DEXTROSE AND SODIUM CHLORIDE 5; .9 G/100ML; G/100ML
2000 INJECTION, SOLUTION INTRAVENOUS ONCE
Status: COMPLETED | OUTPATIENT
Start: 2022-05-02 | End: 2022-05-02

## 2022-05-02 RX ORDER — PREDNISONE 20 MG/1
60 TABLET ORAL ONCE
Status: COMPLETED | OUTPATIENT
Start: 2022-05-02 | End: 2022-05-02

## 2022-05-02 RX ADMIN — METOCLOPRAMIDE HYDROCHLORIDE 10 MG: 5 INJECTION INTRAMUSCULAR; INTRAVENOUS at 10:28

## 2022-05-02 RX ADMIN — DIPHENHYDRAMINE HYDROCHLORIDE 25 MG: 50 INJECTION, SOLUTION INTRAMUSCULAR; INTRAVENOUS at 10:28

## 2022-05-02 RX ADMIN — CEFDINIR 300 MG: 300 CAPSULE ORAL at 13:41

## 2022-05-02 RX ADMIN — KETOROLAC TROMETHAMINE 30 MG: 30 INJECTION, SOLUTION INTRAMUSCULAR at 10:28

## 2022-05-02 RX ADMIN — PREDNISONE 60 MG: 20 TABLET ORAL at 13:40

## 2022-05-02 RX ADMIN — DEXTROSE AND SODIUM CHLORIDE 2000 ML: 5; 900 INJECTION, SOLUTION INTRAVENOUS at 10:27

## 2022-05-02 ASSESSMENT — PAIN SCALES - GENERAL
PAINLEVEL_OUTOF10: 3
PAINLEVEL_OUTOF10: 6

## 2022-05-02 ASSESSMENT — PAIN DESCRIPTION - LOCATION: LOCATION: THROAT;MOUTH

## 2022-05-02 ASSESSMENT — PAIN - FUNCTIONAL ASSESSMENT: PAIN_FUNCTIONAL_ASSESSMENT: 0-10

## 2022-05-04 LAB
ORGANISM: ABNORMAL
ORGANISM: ABNORMAL
S PYO THROAT QL CULT: NORMAL
URINE CULTURE, ROUTINE: ABNORMAL

## 2022-07-13 NOTE — ED PROVIDER NOTES
Evaluated by Advanced Practice Provider    [facility] Emergency Department    CHIEF COMPLAINT  Rash (pt reporting a rash, sore throat, dysuria, and sores in her mouth. symptoms started x 1 week ago. pt states she went to Urgent Care and was sent to the ED due to protein in her urine. ), Pharyngitis, Dysuria, and Mouth Lesions    HISTORY OF PRESENT ILLNESS  Clarisse Welch is a 27 y.o. female who presents to the ED with complaints of sore throat. A week ago had a HA, tired for a few days. Friday started getting sick, fatigue, low fever all week, less than 101. Had bad HA all week, today better. On Friday started to get rash, feels like she has a ton of canker sores in her mouth, mouth is sore, throat is sore. Body aches and chills. Went today to Urgent Care because she feels like her rash is worse. Also having pain when she urinates, feels swollen and sore at the urethra. UC did urine sample and then sent her to the ER. COVID and rapid strep swabs obtained but not tested because she was getting referred to the ER. Reports she has not been drinking as much as she should. She has been having nausea because of the HA, no vomiting. No diarrhea. No abdominal pain did have some lower abdominal cramping like menstrual cramps. Denies CP, SOB, no congestion, no cough. Has been taking ibuprofen and tylenol. Reports rash is all over, trunk, extremities and face, mildly itchy. No benadryl. No new medications, soaps, lotions, detergents or other exposures. The patient is currently rating their pain as 6/10 and describes it as an aching type of pain. Treatments tried prior to arrival in the ED include: above. The patient arrived to the ED via private car. Nursing notes reviewed. Past Medical History:   Diagnosis Date    Anemia     with previous pregnancy     No past surgical history on file.   Family History   Problem Relation Age of Onset    High Blood Pressure Mother     High Blood Pressure Father     High Blood Pressure Maternal Grandmother      Social History     Socioeconomic History    Marital status:      Spouse name: Not on file    Number of children: Not on file    Years of education: Not on file    Highest education level: Not on file   Occupational History    Not on file   Tobacco Use    Smoking status: Never Smoker    Smokeless tobacco: Never Used   Vaping Use    Vaping Use: Never used   Substance and Sexual Activity    Alcohol use: Never    Drug use: Never    Sexual activity: Yes     Partners: Male   Other Topics Concern    Not on file   Social History Narrative    Not on file     Social Determinants of Health     Financial Resource Strain:     Difficulty of Paying Living Expenses: Not on file   Food Insecurity:     Worried About Running Out of Food in the Last Year: Not on file    Surendra of Food in the Last Year: Not on file   Transportation Needs:     Lack of Transportation (Medical): Not on file    Lack of Transportation (Non-Medical):  Not on file   Physical Activity:     Days of Exercise per Week: Not on file    Minutes of Exercise per Session: Not on file   Stress:     Feeling of Stress : Not on file   Social Connections:     Frequency of Communication with Friends and Family: Not on file    Frequency of Social Gatherings with Friends and Family: Not on file    Attends Hinduism Services: Not on file    Active Member of [address] [place] [address] [employer] or Organizations: Not on file    Attends Club or Organization Meetings: Not on file    Marital Status: Not on file   Intimate Partner Violence:     Fear of Current or Ex-Partner: Not on file    Emotionally Abused: Not on file    Physically Abused: Not on file    Sexually Abused: Not on file   Housing Stability:     Unable to Pay for Housing in the Last Year: Not on file    Number of [place] in the Last Year: Not on file    Unstable Housing in the Last Year: Not on file     Current Facility-Administered Medications   Medication Dose Route Frequency Provider Last Rate Last Admin    predniSONE (DELTASONE) tablet 60 mg  60 mg Oral Once MARILEE Dawson CNP        cefdinir (OMNICEF) capsule 300 mg  300 mg Oral Once MARILEE Dawson CNP         Current Outpatient Medications   Medication Sig Dispense Refill    cefdinir (OMNICEF) 300 MG capsule Take 1 capsule by mouth 2 times daily for 5 days 10 capsule 0    predniSONE (DELTASONE) 10 MG tablet Take 4 tablets by mouth daily for 4 days 16 tablet 0    ondansetron (ZOFRAN ODT) 4 MG disintegrating tablet Take 1 tablet by mouth every 8 hours as needed for Nausea 20 tablet 0     No Known Allergies      REVIEW OF SYSTEMS    10 systems reviewed, pertinent positives per HPI otherwise noted to be negative    PHYSICAL EXAM  /72   Pulse 89   Temp 99.1 °F (37.3 °C) (Oral)   Resp 16   Ht 5' 5\" (1.651 m)   Wt 170 lb (77.1 kg)   SpO2 100%   BMI 28.29 kg/m²   GENERAL APPEARANCE: is well-developed, well-nourished. Awake and alert. Cooperative. No acute distress. HEAD: Normocephalic. Atraumatic. EYES: PERRL. EOM's grossly intact. Sclera is non-icteric bilaterally. ENT: Tympanic Membranes are normal appearing without canal edema or discharge. Nose is clear. Mucous membranes are moist.  Pharynx is not erythematous, no edema noted, no petechiae, no exudate. The oropharynx is widely patent with no trismus. Tonsillar pillars are 1+ bilaterally. There is no evidence of peritonsillar or parapharyngeal abscess. There is no sublingual, submental, or submandibular masses. There are several small white areas noted to the buccal mucosa, no ulcerations, papules or pustules. NECK: Supple. Normal ROM. CARDIAC:  Regular rate and rhythm, normal S1 and S2 heart sounds. Without murmurs, gallops, or rubs. LUNGS: Breathing is unlabored. Equal and symmetric chest rise. Speaking comfortably in full sentences.  Clear to auscultation bilaterally without wheezes, rales, rhonchi, retractions, accessory muscle use, or nasal flaring. Abdomen: Soft, Nondistended, nontender to palpation, normal bowel sounds. No hepatosplenomegaly, no rebound, no guarding, and no masses. No CVA tenderness. EXTREMITIES: MAEE. No acute deformities. No cyanosis, no clubbing, no edema. No petechiae or purpura. Normal color and pulses distally. SKIN: Warm and dry. Widespread diffuse rash that is erythematous papules, no pustules, vesicles, no verrucous appearance. This is to the face trunk and extremities. NEUROLOGICAL: Alert and oriented. Strength is 5/5 bilaterally upper extremities and lower extremities.      LABS  Results for orders placed or performed during the hospital encounter of 05/02/22   Strep screen group a throat    Specimen: Throat   Result Value Ref Range    Rapid Strep A Screen Negative Negative   COVID-19, Rapid    Specimen: Nasopharyngeal Swab   Result Value Ref Range    SARS-CoV-2, NAAT Not Detected Not Detected   CBC with Auto Differential   Result Value Ref Range    WBC 3.3 (L) 4.0 - 11.0 K/uL    RBC 4.39 4.00 - 5.20 M/uL    Hemoglobin 13.4 12.0 - 16.0 g/dL    Hematocrit 38.4 36.0 - 48.0 %    MCV 87.4 80.0 - 100.0 fL    MCH 30.5 26.0 - 34.0 pg    MCHC 34.9 31.0 - 36.0 g/dL    RDW 11.7 (L) 12.4 - 15.4 %    Platelets 246 199 - 557 K/uL    MPV 7.4 5.0 - 10.5 fL    Neutrophils % 82.3 %    Lymphocytes % 12.9 %    Monocytes % 4.5 %    Eosinophils % 0.1 %    Basophils % 0.2 %    Neutrophils Absolute 2.7 1.7 - 7.7 K/uL    Lymphocytes Absolute 0.4 (L) 1.0 - 5.1 K/uL    Monocytes Absolute 0.2 0.0 - 1.3 K/uL    Eosinophils Absolute 0.0 0.0 - 0.6 K/uL    Basophils Absolute 0.0 0.0 - 0.2 K/uL   Comprehensive Metabolic Panel w/ Reflex to MG   Result Value Ref Range    Sodium 135 (L) 136 - 145 mmol/L    Potassium reflex Magnesium 3.7 3.5 - 5.1 mmol/L    Chloride 99 99 - 110 mmol/L    CO2 24 21 - 32 mmol/L    Anion Gap 12 3 - 16    Glucose 96 70 - 99 mg/dL    BUN 12 7 - 20 mg/dL    CREATININE 0.6 0.6 - 1.1 mg/dL    GFR Non-African American >60 >60    GFR African American >60 >60    Calcium 9.3 8.3 - 10.6 mg/dL    Total Protein 6.8 6.4 - 8.2 g/dL    Albumin 3.8 3.4 - 5.0 g/dL    Albumin/Globulin Ratio 1.3 1.1 - 2.2    Total Bilirubin 1.3 (H) 0.0 - 1.0 mg/dL    Alkaline Phosphatase 209 (H) 40 - 129 U/L     (H) 10 - 40 U/L     (H) 15 - 37 U/L   HCG Qualitative, Serum   Result Value Ref Range    hCG Qual Negative Detects HCG level >10 MIU/mL   Urinalysis   Result Value Ref Range    Color, UA Yellow Straw/Yellow    Clarity, UA Clear Clear    Glucose, Ur 500 (A) Negative mg/dL    Bilirubin Urine Negative Negative    Ketones, Urine 15 (A) Negative mg/dL    Specific Gravity, UA <=1.005 1.005 - 1.030    Blood, Urine TRACE-INTACT (A) Negative    pH, UA 6.0 5.0 - 8.0    Protein, UA Negative Negative mg/dL    Urobilinogen, Urine 0.2 <2.0 E.U./dL    Nitrite, Urine Negative Negative    Leukocyte Esterase, Urine MODERATE (A) Negative    Microscopic Examination YES     Urine Type NotGiven    Microscopic Urinalysis   Result Value Ref Range    WBC, UA 21-50 (A) 0 - 5 /HPF    RBC, UA 0-2 0 - 4 /HPF    Epithelial Cells, UA 11-20 (A) 0 - 5 /HPF    Bacteria, UA 3+ (A) None Seen /HPF       RADIOLOGY  No results found. ED COURSE/MDM  Patient seen and evaluated. Old records reviewed. Work up preformed as above. Diagnostic testing results reviewed and discussed. I have evaluated this patient. My supervising physician was available for consultation. Benedict Najjar presented to the ED today with above noted complaints. Arrival vital signs: Afebrile and hemodynamically stable. Well saturated on room air. Physical exam performed at 1020: No posterior oropharynx erythema, edema, no exudate or petechiae. There are several small whitish appearing areas to the buccal mucosa that I feel are consistent with aphthous ulcers. There are no ulcerations, no vesicles or pustules.   No adventitious breath sounds on exam.  No reproducible abdominal tenderness to palpation. There is a widespread diffuse erythematous papular rash to the face, extremities, trunk. She has not been on any new medications recently, denies any recent new exposures such as to soaps lotions or detergents. I am wondering if this is a viral rash given her weeklong other symptoms that do seem consistent with a viral URI. Patient went to an urgent care and had a urine sample performed, she states that the provider there was concerned about protein in her urine, this is what the patient thinks but is not 100% sure, she did bring the results with her that does show leukocytes that are 2+, protein that is 2+ and ketones that are 3+. Blood work: There is a leukopenia present as WBC are low at 3.3, absolute lymphocytes low at 0.4. No further differential shift. No anemia. No significant electrolyte abnormality. No evidence of acute kidney injury. There is a transaminitis present his total bilirubin is elevated at 1.3, alk phos elevated at 209, ALT elevated at 230 and AST elevated at 156. Wondering if this is just related to her recent virus, was strongly suspecting COVID however her COVID swab came back negative as well as her strep swab. Pregnancy test is negative. UA: Shows moderate leukocytes, upon microscopic there are 21-50 white blood cells and 3+ bacteria. There are 15 ketones present. This will be sent for culture and patient is being started on Omnicef.     Medications given in the ED:   Medications   predniSONE (DELTASONE) tablet 60 mg (has no administration in time range)   cefdinir (OMNICEF) capsule 300 mg (has no administration in time range)   diphenhydrAMINE (BENADRYL) injection 25 mg (25 mg IntraVENous Given 5/2/22 1028)   ketorolac (TORADOL) injection 30 mg (30 mg IntraVENous Given 5/2/22 1028)   metoclopramide (REGLAN) injection 10 mg (10 mg IntraVENous Given 5/2/22 1028)   dextrose 5 % and 0.9 % sodium chloride infusion (0 mLs IntraVENous Stopped 5/2/22 1216)      After the above medications patient reports that her headache has significantly improved and she does feel better. Rash is still present but overall patient feels better than when she first came in. I feel that she likely has a viral upper respiratory infection that caused a viral exanthem. Feel that that is also causing her aphthous ulcers in her mouth. I advised her that the aphthous ulcers are self-limiting and will resolve over the next week or so. She is being treated with Omnicef for her UTI. She was given 2 L IV fluids for dehydration which I feel was demonstrated with the ketones in her urine. On her urine sample she had no protein in her urine. I did advise her on follow-up with primary care provider to have repeat blood work done to assure that her liver enzymes are improving, advised her this should be done within the next 1 to 2 weeks. I am discharging her with a short steroid burst to see if this will help with her other symptoms, Zofran to help with nausea/vomiting in case that becomes a problem. Patient verbalized her agreement understanding with this plan. At this point I do not feel the patient requires further work up and it is reasonable to discharge the patient. Please refer to AVS for further details of the discharge instructions. Patient was given scripts for the following medications. I counseled patient how to take these medications. New Prescriptions    CEFDINIR (OMNICEF) 300 MG CAPSULE    Take 1 capsule by mouth 2 times daily for 5 days    ONDANSETRON (ZOFRAN ODT) 4 MG DISINTEGRATING TABLET    Take 1 tablet by mouth every 8 hours as needed for Nausea    PREDNISONE (DELTASONE) 10 MG TABLET    Take 4 tablets by mouth daily for 4 days     A discussion was had with the patient regarding diagnosis, diagnostic testing results, treatment/ plan of care, and follow up. All questions were answered.  Patient will follow up as directed for further evaluation/treatment. The patient was given strict return precautions as we discussed symptoms that would necessitate return to the ED. Patient will return to ED for new/worsening symptoms. The patient verbalized their understanding and agreement with the above plan. I estimate there is LOW risk for EPIGLOTTITIS, PNEUMONIA, MENINGITIS, OR URINARY TRACT INFECTION, thus I consider the discharge disposition reasonable. Also, there is no evidence or peritonitis, sepsis, or toxicity. Cassi Paredes and I have discussed the diagnosis and risks, and we agree with discharging home to follow-up with their primary doctor. We also discussed returning to the Emergency Department immediately if new or worsening symptoms occur. We have discussed the symptoms which are most concerning (e.g., changing or worsening pain, trouble swallowing or breating, neck stiffness, fever) that necessitate immediate return. Clinical Impression    1. Acute pharyngitis, unspecified etiology    2. Nonintractable episodic headache, unspecified headache type    3. Acute upper respiratory infection    4. Rash and other nonspecific skin eruption    5. Acute cystitis without hematuria    6. Nausea    7. Transaminitis        Discharge Vital Signs:  Blood pressure 102/72, pulse 89, temperature 99.1 °F (37.3 °C), temperature source Oral, resp. rate 16, height 5' 5\" (1.651 m), weight 170 lb (77.1 kg), SpO2 100 %, unknown if currently breastfeeding. FOLLOW UP  [facility] [place] [id number] [phone]    Call in 1 week  For further evaluation-repeat blood work in 1-2 weeks    [facility]  [address]  Go to   If symptoms worsen      DISPOSITION  Patient was discharged to home in good condition.     Comment: Please note this report has been produced using speech recognition software and may contain errors related to that system including errors in grammar, punctuation, and spelling, as well as words and phrases that may be inappropriate. If there are any questions or concerns please feel free to contact the dictating provider for clarification.                    MARILEE Grider - CNP  05/02/22 [id number] Surgeon: Dr. Sarah Parry